# Patient Record
Sex: MALE | Race: BLACK OR AFRICAN AMERICAN | Employment: FULL TIME | URBAN - METROPOLITAN AREA
[De-identification: names, ages, dates, MRNs, and addresses within clinical notes are randomized per-mention and may not be internally consistent; named-entity substitution may affect disease eponyms.]

---

## 2019-07-22 ENCOUNTER — HOSPITAL ENCOUNTER (OUTPATIENT)
Dept: SLEEP MEDICINE | Age: 44
Discharge: HOME | End: 2019-07-22
Payer: COMMERCIAL

## 2019-07-22 PROCEDURE — 95810 POLYSOM 6/> YRS 4/> PARAM: CPT

## 2019-07-23 ENCOUNTER — HOSPITAL ENCOUNTER (OUTPATIENT)
Dept: SLEEP MEDICINE | Age: 44
Discharge: HOME | End: 2019-07-23
Payer: COMMERCIAL

## 2019-07-23 PROCEDURE — 95805 MULTIPLE SLEEP LATENCY TEST: CPT

## 2020-08-25 PROBLEM — I10 BENIGN ESSENTIAL HTN: Status: ACTIVE | Noted: 2017-08-17

## 2020-08-25 PROBLEM — F32.A ANXIETY AND DEPRESSION: Status: ACTIVE | Noted: 2017-08-17

## 2020-08-25 PROBLEM — Z79.899 ENCOUNTER FOR LONG-TERM (CURRENT) USE OF MEDICATIONS: Status: ACTIVE | Noted: 2020-08-25

## 2020-08-25 PROBLEM — R00.0 TACHYCARDIA: Status: ACTIVE | Noted: 2020-08-25

## 2020-08-25 PROBLEM — F41.9 ANXIETY AND DEPRESSION: Status: ACTIVE | Noted: 2017-08-17

## 2020-08-25 PROBLEM — F51.01 PRIMARY INSOMNIA: Status: ACTIVE | Noted: 2020-08-25

## 2020-08-25 PROBLEM — I20.0 UNSTABLE ANGINA (HCC): Status: ACTIVE | Noted: 2018-05-08

## 2020-08-25 PROBLEM — Z86.16 HISTORY OF 2019 NOVEL CORONAVIRUS DISEASE (COVID-19): Status: ACTIVE | Noted: 2020-08-25

## 2022-03-18 PROBLEM — R00.0 TACHYCARDIA: Status: ACTIVE | Noted: 2020-08-25

## 2022-03-19 PROBLEM — Z86.16 HISTORY OF 2019 NOVEL CORONAVIRUS DISEASE (COVID-19): Status: ACTIVE | Noted: 2020-08-25

## 2022-03-19 PROBLEM — F32.A ANXIETY AND DEPRESSION: Status: ACTIVE | Noted: 2017-08-17

## 2022-03-19 PROBLEM — I20.0 UNSTABLE ANGINA (HCC): Status: ACTIVE | Noted: 2018-05-08

## 2022-03-19 PROBLEM — I10 ESSENTIAL HYPERTENSION: Status: ACTIVE | Noted: 2017-08-17

## 2022-03-19 PROBLEM — F51.01 PRIMARY INSOMNIA: Status: ACTIVE | Noted: 2020-08-25

## 2022-03-19 PROBLEM — F41.9 ANXIETY AND DEPRESSION: Status: ACTIVE | Noted: 2017-08-17

## 2022-03-20 PROBLEM — Z79.899 ENCOUNTER FOR LONG-TERM (CURRENT) USE OF MEDICATIONS: Status: ACTIVE | Noted: 2020-08-25

## 2024-02-22 ENCOUNTER — TELEPHONE (OUTPATIENT)
Dept: SLEEP MEDICINE | Age: 49
End: 2024-02-22

## 2024-02-23 ENCOUNTER — OFFICE VISIT (OUTPATIENT)
Dept: SLEEP MEDICINE | Age: 49
End: 2024-02-23
Payer: COMMERCIAL

## 2024-02-23 VITALS
DIASTOLIC BLOOD PRESSURE: 87 MMHG | HEART RATE: 95 BPM | SYSTOLIC BLOOD PRESSURE: 135 MMHG | OXYGEN SATURATION: 95 % | TEMPERATURE: 97.3 F | HEIGHT: 75 IN | BODY MASS INDEX: 27.48 KG/M2 | WEIGHT: 221 LBS

## 2024-02-23 DIAGNOSIS — R06.83 SNORING: ICD-10-CM

## 2024-02-23 DIAGNOSIS — G47.10 HYPERSOMNIA: ICD-10-CM

## 2024-02-23 DIAGNOSIS — G47.8 NON-RESTORATIVE SLEEP: ICD-10-CM

## 2024-02-23 DIAGNOSIS — G47.33 OSA (OBSTRUCTIVE SLEEP APNEA): Primary | ICD-10-CM

## 2024-02-23 DIAGNOSIS — G47.00 PERSISTENT DISORDER OF INITIATING OR MAINTAINING SLEEP: ICD-10-CM

## 2024-02-23 PROCEDURE — 3079F DIAST BP 80-89 MM HG: CPT | Performed by: NURSE PRACTITIONER

## 2024-02-23 PROCEDURE — 3075F SYST BP GE 130 - 139MM HG: CPT | Performed by: NURSE PRACTITIONER

## 2024-02-23 PROCEDURE — 99203 OFFICE O/P NEW LOW 30 MIN: CPT | Performed by: NURSE PRACTITIONER

## 2024-02-23 ASSESSMENT — SLEEP AND FATIGUE QUESTIONNAIRES
HOW LIKELY ARE YOU TO NOD OFF OR FALL ASLEEP WHILE WATCHING TV: 2
HOW LIKELY ARE YOU TO NOD OFF OR FALL ASLEEP WHILE SITTING AND TALKING TO SOMEONE: 1
HOW LIKELY ARE YOU TO NOD OFF OR FALL ASLEEP IN A CAR, WHILE STOPPED FOR A FEW MINUTES IN TRAFFIC: 2
HOW LIKELY ARE YOU TO NOD OFF OR FALL ASLEEP WHILE SITTING AND READING: 3
ESS TOTAL SCORE: 17
HOW LIKELY ARE YOU TO NOD OFF OR FALL ASLEEP WHEN YOU ARE A PASSENGER IN A CAR FOR AN HOUR WITHOUT A BREAK: 2
HOW LIKELY ARE YOU TO NOD OFF OR FALL ASLEEP WHILE SITTING INACTIVE IN A PUBLIC PLACE: 2
HOW LIKELY ARE YOU TO NOD OFF OR FALL ASLEEP WHILE SITTING QUIETLY AFTER LUNCH WITHOUT ALCOHOL: 3
HOW LIKELY ARE YOU TO NOD OFF OR FALL ASLEEP WHILE LYING DOWN TO REST IN THE AFTERNOON WHEN CIRCUMSTANCES PERMIT: 2

## 2024-02-23 NOTE — PATIENT INSTRUCTIONS
Continue CPAP 8-15 cm H2O with nightly compliance  New CPAP machine and supplies ordered  Recommendations as above  Follow-up in 6 months or sooner if needed

## 2024-02-23 NOTE — PROGRESS NOTES
Substance and Sexual Activity    Alcohol use: No    Drug use: Not on file    Sexual activity: Not on file   Other Topics Concern    Not on file   Social History Narrative    Not on file     Social Determinants of Health     Financial Resource Strain: Not on file   Food Insecurity: Not on file   Transportation Needs: Not on file   Physical Activity: Not on file   Stress: Not on file   Social Connections: Not on file   Intimate Partner Violence: Not on file   Housing Stability: Not on file         Family History   Problem Relation Age of Onset    Hypertension Father     Hypertension Mother          Not on File      Current Outpatient Medications   Medication Sig    APPLE CIDER VINEGAR PO Take by mouth daily    amLODIPine (NORVASC) 10 MG tablet Take by mouth daily    Melatonin 5 MG CAPS Take by mouth    metoprolol succinate (TOPROL XL) 25 MG extended release tablet Take 25 mg by mouth daily     No current facility-administered medications for this visit.           REVIEW OF SYSTEMS:   CONSTITUTIONAL:   There is no history of fever, chills, night sweats, weight loss, weight gain, persistent fatigue, or lethargy/hypersomnolence.   EYES:   Denies problems with eye pain, erythema, blurred vision, or visual field loss.   ENTM:   Denies history of tinnitus, epistaxis, sore throat, hoarseness, or dysphonia.   LYMPH:   Denies swollen glands.   CARDIAC:   No chest pain, pressure, discomfort, palpitations, orthopnea, murmurs, or edema.   GI:   No dysphagia, heartburn reflux, nausea/vomiting, diarrhea, abdominal pain, or bleeding.   :   Denies history of dysuria, hematuria, polyuria, or decreased urine output.   MS:   No history of myalgias, arthralgias, bone pain, or muscle cramps.   SKIN:   No history of rashes, jaundice, cyanosis, nodules, or ulcers.   ENDO:   Negative for heat or cold intolerance.  No history of DM.   PSYCH:   Negative for anxiety, depression, insomnia, hallucinations.   NEURO:   There is no history of 
or sooner if needed       Orders Placed This Encounter   Procedures    DME - DURABLE MEDICAL EQUIPMENT     [facility]  Phone: [address] [place] [address]  Dept: [phone]      Patient Name: Fausto Malhotra Jr.  : 1975  Gender: male  Address: [facility] [address] [place] [address]  Patient phone number: [phone] (home) [phone] (work)      Primary Insurance: Payor: [employer] / Plan: [email or web address] [place] / Product Type: *No Product type* /   Subscriber ID: [id number] - ([employer])      AMB Supply Order  Order Details     DME Location:  [facility]   Order Date: 2024   The primary encounter diagnosis was RENATE (obstructive sleep apnea). Diagnoses of Snoring, Non-restorative sleep, Hypersomnia, and Persistent disorder of initiating or maintaining sleep were also pertinent to this visit.          (  X   )New Set-Up (Replacement Machine)     CPAP machine   (     ) CPAP Unit  ( x    ) Auto CPAP Unit  (     ) BiLevel Unit  (     ) Auto BiLevel Unit  (     ) ASV   (     ) Bilevel ST    (     ) Oxygen Concentrator         Length of need: 12 months    Pressure: 8-15  cmH20  EPR: 1     Starting Ramp Pressure:   cm H20  Ramp Time: min      Patient had a diagnostic Apnea Hypopnea Index (AHI) of :  11.7    *SUPPLIES* Replace all as needed, or per coverage guidelines     Masks Type:    (  x   ) -Full Face Mask (1 per 3 mon)  ( x    ) -Full Mask (1 per month) Interface/Cushion      (     ) -Nasal Mask (1 per 3 mon)  (     ) - Nasal Mask (1 per month) Interface/Cushion  (     ) -Pillow (2 per mon)  (     ) -Gshdhrdzg (1 per 6 mon)      _________________________________________________________________          Other Supplies:    (  X   )-Beexbodo (1 per 6 mon)  ( X    )-Gmjhjm Tubing (1 per 3 mon)  (  X   )- Disposable Filter (2 per mon)  (   X  )-Egerzv Humidifier (1 per year)     (

## 2024-05-02 ENCOUNTER — OFFICE VISIT (OUTPATIENT)
Age: 49
End: 2024-05-02
Payer: COMMERCIAL

## 2024-05-02 VITALS — WEIGHT: 216 LBS | HEIGHT: 75 IN | BODY MASS INDEX: 26.86 KG/M2

## 2024-05-02 DIAGNOSIS — M48.02 CERVICAL SPINAL STENOSIS: Primary | ICD-10-CM

## 2024-05-02 DIAGNOSIS — M50.30 DDD (DEGENERATIVE DISC DISEASE), CERVICAL: ICD-10-CM

## 2024-05-02 DIAGNOSIS — M47.812 CERVICAL SPONDYLOSIS: ICD-10-CM

## 2024-05-02 PROCEDURE — 99204 OFFICE O/P NEW MOD 45 MIN: CPT | Performed by: ORTHOPAEDIC SURGERY

## 2024-05-02 NOTE — PROGRESS NOTES
Name: Fausto Malhotra Jr.  YOB: 1975  Gender: male  MRN: [id number]  Age: 49 y.o.    Chief Complaint: Neck and radiating upper extremity pain.    History of present illness:    This is a very pleasant 49 y.o. male who presents with a longstanding history of neck pain and radiation primarily to the left shoulder and upper extremity.  The onset of the symptoms was rather insidious. The quality of the pain is described as a deep ache in the neck and shoulder area with intermittent sharp and shooting sensations.  A tingling sensation is over the C7 distribution including the lateral neck, posterior arm, lateral elbow, radial forearm, and index and middle fingers. There is also some associated pain in the periscapular area.  The symptoms seem to be aggravated by overhead activities, and somewhat alleviated by medication and rest.  Thus far, efforts to address the pain have included   chiropractic care, cervical traction, gabapentin, ibuprofen, and 2 epidural steroid injections .         Medications:     Prior to Visit Medications    Medication Sig Taking? Authorizing Provider   APPLE CIDER VINEGAR PO Take by mouth daily Yes Automatic Reconciliation, Ar   amLODIPine (NORVASC) 10 MG tablet Take by mouth daily Yes Automatic Reconciliation, Ar   Melatonin 5 MG CAPS Take by mouth Yes Automatic Reconciliation, Ar   metoprolol succinate (TOPROL XL) 25 MG extended release tablet Take 1 tablet by mouth daily Yes Automatic Reconciliation, Ar       Allergies:     No Known Allergies     Physical Exam:     This is a well developed well nourished adult male in no acute distress.     Oriented to person, place and time.    Mood and affect are appropriate.    Respirations are unlabored and there is no evidence of cyanosis.    Heart is regular rate and rhythm    Patient can flex normally but extension is limited by exacerbation of the symptoms.      Spurling's sign is positive to the left side for reproduction of

## 2024-11-07 ENCOUNTER — OFFICE VISIT (OUTPATIENT)
Age: 49
End: 2024-11-07
Payer: COMMERCIAL

## 2024-11-07 DIAGNOSIS — M48.02 CERVICAL SPINAL STENOSIS: Primary | ICD-10-CM

## 2024-11-07 PROCEDURE — 99214 OFFICE O/P EST MOD 30 MIN: CPT | Performed by: ORTHOPAEDIC SURGERY

## 2024-11-07 NOTE — PROGRESS NOTES
Name: Fausto Malhotra Jr.  YOB: 1975  Gender: male  MRN: [id number]  Age: 49 y.o.     History of present illness:     This is the gentleman with severe multilevel cervical stenosis that we have been following with serial neurologic exams.  He is only symptomatic in the form of left ulnar paresthesias.  The patient reports no significant change in symptoms.  He has tried chiropractic care, cervical traction, gabapentin, ibuprofen, and a couple of cervical epidural steroid injections.  Due to the severity of stenosis, I recommended avoiding additional epidural injections and chiropractic manipulations but rather watching closely for any progression of symptoms.    Physical Exam:    He is awake and oriented in no acute distress.  Respirations are unlabored and there is no evidence of cyanosis.  Mood and affect are appropriate.  He has 5/5 strength in all major muscle groups in both upper and lower extremities.  He has 2+ deep tendon reflexes.  Kristin's is negative.  Ankle jerk is negative.  Gait is stable with tandem heel-to-toe walking.  He has light touch sensory disturbance in the ulnar left arm and hand.    Radiographic Studies:     I again reviewed his MRI which shows severe stenosis from C3-C7.  There is cord compression but no myelomalacia.    Diagnosis:      ICD-10-CM    1. Cervical spinal stenosis  M48.02           Assessment/Plan:      The patient has advanced cervical stenosis but without significant myelopathy.  He has ulnar paresthesias but no spasticity, loss of dexterity, or gait disturbance.  He is at significant risk for progression and we have discussed observing closely.  He is quite self-aware and is in favor of maintaining a nonoperative course with close observation.  I am in agreement and will continue serial neurologic exams.  Will see him back in 6 months.  He will follow-up earlier if he experiences any kind of neurologic decline.  In that scenario, I would recommend